# Patient Record
Sex: FEMALE | Race: WHITE | NOT HISPANIC OR LATINO | Employment: OTHER | URBAN - METROPOLITAN AREA
[De-identification: names, ages, dates, MRNs, and addresses within clinical notes are randomized per-mention and may not be internally consistent; named-entity substitution may affect disease eponyms.]

---

## 2023-09-27 ENCOUNTER — OFFICE VISIT (OUTPATIENT)
Dept: ORTHOPEDIC SURGERY | Facility: CLINIC | Age: 68
End: 2023-09-27
Payer: MEDICARE

## 2023-09-27 VITALS
SYSTOLIC BLOOD PRESSURE: 120 MMHG | BODY MASS INDEX: 21.66 KG/M2 | HEIGHT: 65 IN | DIASTOLIC BLOOD PRESSURE: 80 MMHG | WEIGHT: 130 LBS

## 2023-09-27 DIAGNOSIS — M17.0 PRIMARY OSTEOARTHRITIS OF BOTH KNEES: Primary | ICD-10-CM

## 2023-09-27 PROCEDURE — 1159F MED LIST DOCD IN RCRD: CPT | Performed by: ORTHOPAEDIC SURGERY

## 2023-09-27 PROCEDURE — 1160F RVW MEDS BY RX/DR IN RCRD: CPT | Performed by: ORTHOPAEDIC SURGERY

## 2023-09-27 PROCEDURE — 99204 OFFICE O/P NEW MOD 45 MIN: CPT | Performed by: ORTHOPAEDIC SURGERY

## 2023-09-27 RX ORDER — CETIRIZINE HYDROCHLORIDE 10 MG/1
10 TABLET ORAL DAILY
COMMUNITY

## 2023-09-27 RX ORDER — ARIPIPRAZOLE 5 MG/1
10 TABLET ORAL DAILY
COMMUNITY

## 2023-09-27 RX ORDER — PROPRANOLOL HYDROCHLORIDE 10 MG/1
10 TABLET ORAL
COMMUNITY

## 2023-09-27 RX ORDER — TRAZODONE HYDROCHLORIDE 50 MG/1
50 TABLET ORAL
COMMUNITY
Start: 2023-06-05 | End: 2024-06-04

## 2023-09-27 RX ORDER — MAGNESIUM 30 MG
30 TABLET ORAL DAILY
COMMUNITY

## 2023-09-27 RX ORDER — MULTIVITAMIN
1 CAPSULE ORAL DAILY
COMMUNITY

## 2023-09-27 RX ORDER — LAMOTRIGINE 100 MG/1
TABLET ORAL
COMMUNITY
Start: 2023-06-07

## 2023-09-27 RX ORDER — VIT C/B6/B5/MAGNESIUM/HERB 173 50-5-6-5MG
CAPSULE ORAL DAILY
COMMUNITY

## 2023-09-27 RX ORDER — ASPIRIN 81 MG/1
81 TABLET ORAL DAILY
COMMUNITY

## 2023-09-27 NOTE — PROGRESS NOTES
[facility] [place] [facility] Note    Subjective     Chief Complaint   Patient presents with    Left Knee - Pain    Right Knee - Pain        HPI    Edwina Garcia is a 68 y.o. female who presents with new problem of: bilateral knee pain.  Onset: atraumatic and gradual in nature. The issue has been ongoing for 1 year(s). Pain is a 5/10 on the pain scale. Pain is described as aching. Associated symptoms include pain and popping. The pain is worse with walking, climbing stairs, and sleeping; pain medication and/or NSAID improve the pain. Previous treatments have included: bracing and NSAIDS.  Left knee bothers her more than the right.  Most of the pain is on the anterior aspect of both knees.  No previous injections.  Intermittently uses ibuprofen with improvement.    I have reviewed the following portions of the patient's history and agree with: History of Present Illness and Review of Systems    There is no problem list on file for this patient.    History reviewed. No pertinent past medical history.   Past Surgical History:   Procedure Laterality Date    HYSTERECTOMY      TOE AMPUTATION Left     second toe    TONSILLECTOMY AND ADENOIDECTOMY        Family History   Problem Relation Age of Onset    Arthritis Maternal Grandfather      Social History     Socioeconomic History    Marital status:    Tobacco Use    Smoking status: Never    Smokeless tobacco: Never   Vaping Use    Vaping Use: Never used   Substance and Sexual Activity    Alcohol use: Yes     Alcohol/week: 1.0 - 2.0 standard drink     Types: 1 - 2 Glasses of wine per week    Drug use: Never    Sexual activity: Never      Current Outpatient Medications on File Prior to Visit   Medication Sig Dispense Refill    ARIPiprazole (ABILIFY) 5 MG tablet Take 2 tablets by mouth Daily.      aspirin 81 MG EC tablet Take 1 tablet by mouth Daily.      cetirizine (zyrTEC) 10 MG tablet Take 1 tablet by mouth Daily.      lamoTRIgine (LaMICtal) 100 MG tablet        "magnesium 30 MG tablet Take 1 tablet by mouth Daily.      Multiple Vitamin (multivitamin) capsule Take 1 capsule by mouth Daily.      propranolol (INDERAL) 10 MG tablet Take 1 tablet by mouth.      traZODone (DESYREL) 50 MG tablet Take 1 tablet by mouth.      Turmeric 500 MG capsule Take  by mouth Daily.       No current facility-administered medications on file prior to visit.      Allergies   Allergen Reactions    Amoxicillin GI Intolerance    Penicillin G Hives        Review of Systems   Constitutional: Negative.    HENT: Negative.     Eyes: Negative.    Respiratory: Negative.     Cardiovascular: Negative.    Gastrointestinal: Negative.    Endocrine: Negative.    Genitourinary: Negative.    Musculoskeletal:  Positive for arthralgias.   Skin: Negative.    Allergic/Immunologic: Negative.    Neurological: Negative.    Hematological: Negative.    Psychiatric/Behavioral: Negative.        Objective      Physical Exam  /80   Ht 164 cm (64.57\")   Wt 59 kg (130 lb)   BMI 21.92 kg/m²     Body mass index is 21.92 kg/m².    General:   Mental Status:  Alert   Appearance: Cooperative, in no acute distress   Build and Nutrition: Well-nourished well-developed female   Orientation: Alert and oriented to person, place and time   Posture: Normal   Gait: Nonantalgic/normal    Integument:   Right knee: no skin lesions, no rash, no ecchymosis   Left knee: no skin lesions, no rash, no ecchymosis    Neurologic:   Sensation:    Right foot: intact to light touch on the dorsal and plantar aspect    Left foot: intact to light touch on the dorsal and plantar aspect   Motor:  Right lower extremity: 5/5 quadriceps, hamstrings, ankle dorsiflexors, and ankle plantar flexors  Left lower extremity: 5/5 quadriceps, hamstrings, ankle dorsiflexors, and ankle plantar flexors  Vascular:   Right lower extremity: 2+ dorsalis pedis pulse, prompt capillary refill   Left lower extremity: 2+ dorsalis pedis pulse, prompt capillary refill    Lower " Extremities:   Right Knee:    Tenderness:  None    Effusion:  None    Swelling:  None    Crepitus:  Positive    Atrophy:  None    Range of motion:  Extension: 0°       Flexion: 130°  Instability:  No varus laxity, no valgus laxity, negative anterior drawer  Deformities:  None   Left Knee:    Tenderness:  None    Effusion:  None    Swelling:  None    Crepitus: Positive    Atrophy:  None    Range of motion:  Extension: 0°       Flexion: 130°  Instability:  No varus laxity, no valgus laxity, negative anterior drawer  Deformities:  None      Imaging/Studies      Imaging Results (Last 24 Hours)       Procedure Component Value Units Date/Time    XR Knee 4+ View Bilateral [[id number]] Resulted: 09/27/23 1129     Updated: 09/27/23 1132    Narrative:      Right Knee Radiographs  Indication: right knee pain  Views: Standing AP's and skiers of both knees, with lateral and sunrise   views of the right knee    Comparison: no prior studies available    Findings:    Medial joint space narrowing, tricompartmental osteophytes, patellofemoral   degeneration, no acute bony abnormalities.  No unusual bony features.    Left Knee Radiographs  Indication: left knee pain  Views: Standing AP's and skiers of both knees, with lateral and sunrise   views of the left knee    Comparison: no prior studies available    Findings:    Medial joint space narrowing, tricompartmental osteophytes, patellofemoral   degeneration bone-on-bone contact lateral facet, no acute bony   abnormalities.  Calcification in the intramedullary canal region in the   distal one third of the femur, with no cortical involvement, well-defined,   likely consistent with either bone infarct or calcified enchondroma.    Benign appearing.              Assessment and Plan     Diagnoses and all orders for this visit:    1. Primary osteoarthritis of both knees (Primary)  -     XR Knee 4+ View Bilateral        1. Primary osteoarthritis of both knees        I reviewed my findings with  the patient.  We discussed options for her knee arthritis, and at this point she prefers continuing with conservative treatment with intermittent use of ibuprofen as needed.  We discussed intra-articular injections today, but she is not keen on that idea.  I did recommend follow-up radiographs in 3 months on her left knee in particular, for follow-up of the benign-appearing bone lesion.  If there is any question going forward, we may consider further imaging with MRI.  No pain in the distal femur clinically.    Return in about 3 months (around 12/27/2023) for Recheck with X-Rays on the left knee.      Obie Darling MD  09/27/23  11:47 EDT

## 2024-01-10 ENCOUNTER — OFFICE VISIT (OUTPATIENT)
Dept: ORTHOPEDIC SURGERY | Facility: CLINIC | Age: 69
End: 2024-01-10
Payer: MEDICARE

## 2024-01-10 VITALS — BODY MASS INDEX: 21.96 KG/M2 | WEIGHT: 130.2 LBS | DIASTOLIC BLOOD PRESSURE: 74 MMHG | SYSTOLIC BLOOD PRESSURE: 136 MMHG

## 2024-01-10 DIAGNOSIS — M17.0 PRIMARY OSTEOARTHRITIS OF BOTH KNEES: Primary | ICD-10-CM

## 2024-01-10 PROCEDURE — 1159F MED LIST DOCD IN RCRD: CPT | Performed by: ORTHOPAEDIC SURGERY

## 2024-01-10 PROCEDURE — 1160F RVW MEDS BY RX/DR IN RCRD: CPT | Performed by: ORTHOPAEDIC SURGERY

## 2024-01-10 PROCEDURE — 99212 OFFICE O/P EST SF 10 MIN: CPT | Performed by: ORTHOPAEDIC SURGERY

## 2024-01-10 RX ORDER — ATOMOXETINE 40 MG/1
1 CAPSULE ORAL DAILY
COMMUNITY
Start: 2024-01-04

## 2024-01-10 NOTE — PROGRESS NOTES
[facility] [place] [facility] Note    Subjective     Chief Complaint   Patient presents with    Follow-up     3.5 month follow-up: Primary osteoarthritis of both knees        HPI    It has been 3.5  month(s) since Ms. Garcia's last visit. She returns to clinic today for follow-up of bilateral knee arthritis. The issue has been ongoing for 1 year(s). She rates her pain a 6/10 on the pain scale. Previous/current treatments: NSAIDS and physical therapy. Current symptoms: same as prior visit. The pain is worse with sleeping; pain medication and/or NSAID improve the pain. Overall, she is doing better.  Symptoms still controlled with ibuprofen intermittently.  No new complaints today.  No thigh pain.  Here today to have comparative x-rays obtained of the left knee.    I have reviewed the following portions of the patient's history and agree with: History of Present Illness and Review of Systems    There is no problem list on file for this patient.    Past Medical History:   Diagnosis Date    Hip arthrosis     Knee swelling     Scoliosis     Controlled-no issues currently      Past Surgical History:   Procedure Laterality Date    FOOT SURGERY  Bilateral bunionectomies    HYSTERECTOMY      TOE AMPUTATION Left     second toe    TONSILLECTOMY AND ADENOIDECTOMY        Family History   Problem Relation Age of Onset    Arthritis Maternal Grandfather     Osteoporosis Mother     Diabetes Father      Social History     Socioeconomic History    Marital status:    Tobacco Use    Smoking status: Former     Packs/day: 2.00     Years: 10.00     Additional pack years: 0.00     Total pack years: 20.00     Types: Cigarettes     Start date: 3/1/1978     Quit date: 1986     Years since quittin.5     Passive exposure: Past    Smokeless tobacco: Never   Vaping Use    Vaping Use: Never used   Substance and Sexual Activity    Alcohol use: Yes     Alcohol/week: 1.0 - 2.0 standard drink of alcohol     Types: 1 - 2 Glasses  of wine per week     Comment: wine spritzers not straight    Drug use: Never    Sexual activity: Not Currently     Partners: Male     Birth control/protection: Post-menopausal, Bilateral salpingectomy , Hysterectomy      Current Outpatient Medications on File Prior to Visit   Medication Sig Dispense Refill    ARIPiprazole (ABILIFY) 5 MG tablet Take 2 tablets by mouth Daily.      aspirin 81 MG EC tablet Take 1 tablet by mouth Daily.      atomoxetine (STRATTERA) 40 MG capsule Take 1 capsule by mouth Daily.      cetirizine (zyrTEC) 10 MG tablet Take 1 tablet by mouth Daily.      lamoTRIgine (LaMICtal) 100 MG tablet       magnesium 30 MG tablet Take 1 tablet by mouth Daily.      Multiple Vitamin (multivitamin) capsule Take 1 capsule by mouth Daily.      propranolol (INDERAL) 10 MG tablet Take 1 tablet by mouth.      traZODone (DESYREL) 50 MG tablet Take 1 tablet by mouth.      Turmeric 500 MG capsule Take  by mouth Daily.       No current facility-administered medications on file prior to visit.      Allergies   Allergen Reactions    Amoxicillin GI Intolerance    Penicillin G Hives        Review of Systems   Constitutional:  Negative for activity change, appetite change, chills, diaphoresis, fatigue, fever and unexpected weight change.   HENT:  Negative for congestion, dental problem, drooling, ear discharge, ear pain, facial swelling, hearing loss, mouth sores, nosebleeds, postnasal drip, rhinorrhea, sinus pressure, sneezing, sore throat, tinnitus, trouble swallowing and voice change.    Eyes:  Negative for photophobia, pain, discharge, redness, itching and visual disturbance.   Respiratory:  Negative for apnea, cough, choking, chest tightness, shortness of breath, wheezing and stridor.    Cardiovascular:  Negative for chest pain, palpitations and leg swelling.   Gastrointestinal:  Negative for abdominal distention, abdominal pain, anal bleeding, blood in stool, constipation, diarrhea, nausea, rectal pain and vomiting.    Endocrine: Negative for cold intolerance, heat intolerance, polydipsia, polyphagia and polyuria.   Genitourinary:  Negative for decreased urine volume, difficulty urinating, dysuria, enuresis, flank pain, frequency, genital sores, hematuria and urgency.   Musculoskeletal:  Positive for arthralgias. Negative for back pain, gait problem, joint swelling, myalgias, neck pain and neck stiffness.   Skin:  Negative for color change, pallor, rash and wound.   Allergic/Immunologic: Negative for environmental allergies, food allergies and immunocompromised state.   Neurological:  Negative for dizziness, tremors, seizures, syncope, facial asymmetry, speech difficulty, weakness, light-headedness, numbness and headaches.   Hematological:  Negative for adenopathy. Does not bruise/bleed easily.   Psychiatric/Behavioral:  Negative for agitation, behavioral problems, confusion, decreased concentration, dysphoric mood, hallucinations, self-injury, sleep disturbance and suicidal ideas. The patient is not nervous/anxious and is not hyperactive.         Objective      Physical Exam  /74   Wt 59.1 kg (130 lb 3.2 oz)   BMI 21.96 kg/m²     Body mass index is 21.96 kg/m².    General:   Mental Status:  Alert   Appearance: Cooperative, in no acute distress   Build and Nutrition: Well-nourished well-developed female   Orientation: Alert and oriented to person, place and time   Posture: Normal   Gait: Nonantalgic/normal    Integument:              Right knee: no skin lesions, no rash, no ecchymosis              Left knee: no skin lesions, no rash, no ecchymosis     Lower Extremities:              Right Knee:                          Tenderness:    None                          Effusion:          None                          Swelling:          None                          Crepitus:          Positive                          Atrophy:           None                          Range of motion:        Extension:       0°                                                               Flexion:           130°  Instability:        No varus laxity, no valgus laxity, negative anterior drawer  Deformities:     None              Left Knee:                          Tenderness:    None                          Effusion:          None                          Swelling:          None                          Crepitus:          Positive                          Atrophy:           None                          Range of motion:        Extension:       0°                                                              Flexion:           130°  Instability:        No varus laxity, no valgus laxity, negative anterior drawer  Deformities:     None    Imaging/Studies  Imaging Results (Last 24 Hours)       Procedure Component Value Units Date/Time    XR Knee 4+ View Left [[id number]] Resulted: 01/10/24 1114     Updated: 01/10/24 1116    Narrative:      Left Knee Radiographs  Indication: left knee pain  Views: Standing AP's and skiers of both knees, with lateral and sunrise   views of the left knee    Comparison: 9/27/2023    Findings:   Stable calcification left distal femur, with no change in arthritic   findings.  Calcification does appear to be benign in appearance, with no   cortical disruption and no enlargement compared to previous imaging.                Assessment and Plan     Diagnoses and all orders for this visit:    1. Primary osteoarthritis of both knees (Primary)  -     Cancel: XR Knee 4+ View Left  -     XR Knee 4+ View Left        1. Primary osteoarthritis of both knees        I reviewed my findings with the patient.  Calcification in the left distal femur stable, and benign appearing.  I will see her back in a year with a new x-ray, but I will be happy to see her back sooner for any problems.    Return in about 1 year (around 1/10/2025) for Recheck with X-Rays.      Obie Darling MD  01/10/24  11:20 EST

## 2025-01-13 ENCOUNTER — OFFICE VISIT (OUTPATIENT)
Dept: ORTHOPEDIC SURGERY | Facility: CLINIC | Age: 70
End: 2025-01-13
Payer: MEDICARE

## 2025-01-13 VITALS
BODY MASS INDEX: 23.36 KG/M2 | DIASTOLIC BLOOD PRESSURE: 70 MMHG | SYSTOLIC BLOOD PRESSURE: 126 MMHG | WEIGHT: 140.2 LBS | HEIGHT: 65 IN

## 2025-01-13 DIAGNOSIS — M17.0 PRIMARY OSTEOARTHRITIS OF BOTH KNEES: Primary | ICD-10-CM

## 2025-01-13 PROCEDURE — 1159F MED LIST DOCD IN RCRD: CPT | Performed by: ORTHOPAEDIC SURGERY

## 2025-01-13 PROCEDURE — 1160F RVW MEDS BY RX/DR IN RCRD: CPT | Performed by: ORTHOPAEDIC SURGERY

## 2025-01-13 PROCEDURE — 99213 OFFICE O/P EST LOW 20 MIN: CPT | Performed by: ORTHOPAEDIC SURGERY

## 2025-01-13 RX ORDER — CHOLECALCIFEROL (VITAMIN D3) 25 MCG
5000 TABLET ORAL DAILY
COMMUNITY

## 2025-01-13 NOTE — PROGRESS NOTES
[facility] [place] [facility] Note    Subjective     Chief Complaint   Patient presents with    Follow-up     1 year f/u --  Primary osteoarthritis of both knees        HPI    It has been 1  year(s) since Ms. Garcia's last visit. She returns to clinic today for follow-up of bilateral knee arthritis. The issue has been ongoing for 2 year(s). She rates her pain a 5/10 on the pain scale. Previous/current treatments: bracing and NSAIDS. Current symptoms: pain. The pain is worse with walking, sitting, climbing stairs, and sleeping; resting, ice, and pain medication and/or NSAID improve the pain. Overall, she is doing the same.  Here today for routine follow-up.  Specifically we were getting repeat x-rays of her left distal femur to compare the calcification in the distal femur.  Her knee arthritis symptoms are currently controlled with over-the-counter ibuprofen.      I have reviewed the following portions of the patient's history and agree with: History of Present Illness and Review of Systems    There is no problem list on file for this patient.    Past Medical History:   Diagnosis Date    Hip arthrosis     Knee swelling     Scoliosis     Controlled-no issues currently      Past Surgical History:   Procedure Laterality Date    FOOT SURGERY  Bilateral bunionectomies    HYSTERECTOMY      TOE AMPUTATION Left     second toe    TONSILLECTOMY AND ADENOIDECTOMY        Family History   Problem Relation Age of Onset    Arthritis Maternal Grandfather     Osteoporosis Mother     Diabetes Father      Social History     Socioeconomic History    Marital status:    Tobacco Use    Smoking status: Former     Current packs/day: 0.00     Average packs/day: 2.0 packs/day for 10.0 years (20.1 ttl pk-yrs)     Types: Cigarettes     Start date: 3/1/1978     Quit date: 1986     Years since quittin.5     Passive exposure: Past    Smokeless tobacco: Never   Vaping Use    Vaping status: Never Used   Substance and Sexual  Activity    Alcohol use: Yes     Alcohol/week: 1.0 - 2.0 standard drink of alcohol     Types: 1 - 2 Glasses of wine per week     Comment: wine spritzers not straight    Drug use: Never    Sexual activity: Not Currently     Partners: Male     Birth control/protection: Post-menopausal, Bilateral salpingectomy , Hysterectomy      Current Outpatient Medications on File Prior to Visit   Medication Sig Dispense Refill    ARIPiprazole (ABILIFY) 5 MG tablet Take 2 tablets by mouth Daily.      aspirin 81 MG EC tablet Take 1 tablet by mouth Daily.      atomoxetine (STRATTERA) 40 MG capsule Take 1 capsule by mouth Daily.      cetirizine (zyrTEC) 10 MG tablet Take 1 tablet by mouth Daily.      Cholecalciferol 25 MCG (1000 UT) tablet Take 5 tablets by mouth Daily.      lamoTRIgine (LaMICtal) 100 MG tablet       magnesium 30 MG tablet Take 1 tablet by mouth Daily.      Multiple Vitamin (multivitamin) capsule Take 1 capsule by mouth Daily.      propranolol (INDERAL) 10 MG tablet Take 1 tablet by mouth.      Turmeric 500 MG capsule Take  by mouth Daily.      traZODone (DESYREL) 50 MG tablet Take 1 tablet by mouth.       No current facility-administered medications on file prior to visit.      Allergies   Allergen Reactions    Amoxicillin GI Intolerance    Penicillin G Hives        Review of Systems   Constitutional:  Negative for activity change, appetite change, chills, diaphoresis, fatigue, fever and unexpected weight change.   HENT:  Negative for congestion, dental problem, drooling, ear discharge, ear pain, facial swelling, hearing loss, mouth sores, nosebleeds, postnasal drip, rhinorrhea, sinus pressure, sneezing, sore throat, tinnitus, trouble swallowing and voice change.    Eyes:  Negative for photophobia, pain, discharge, redness, itching and visual disturbance.   Respiratory:  Negative for apnea, cough, choking, chest tightness, shortness of breath, wheezing and stridor.    Cardiovascular:  Negative for chest pain,  "palpitations and leg swelling.   Gastrointestinal:  Negative for abdominal distention, abdominal pain, anal bleeding, blood in stool, constipation, diarrhea, nausea, rectal pain and vomiting.   Endocrine: Negative for cold intolerance, heat intolerance, polydipsia, polyphagia and polyuria.   Genitourinary:  Negative for decreased urine volume, difficulty urinating, dysuria, enuresis, flank pain, frequency, genital sores, hematuria and urgency.   Musculoskeletal:  Positive for arthralgias. Negative for back pain, gait problem, joint swelling, myalgias, neck pain and neck stiffness.   Skin:  Negative for color change, pallor, rash and wound.   Allergic/Immunologic: Negative for environmental allergies, food allergies and immunocompromised state.   Neurological:  Negative for dizziness, tremors, seizures, syncope, facial asymmetry, speech difficulty, weakness, light-headedness, numbness and headaches.   Hematological:  Negative for adenopathy. Does not bruise/bleed easily.   Psychiatric/Behavioral:  Negative for agitation, behavioral problems, confusion, decreased concentration, dysphoric mood, hallucinations, self-injury, sleep disturbance and suicidal ideas. The patient is not nervous/anxious and is not hyperactive.         Objective      Physical Exam  /70   Ht 164 cm (64.57\")   Wt 63.6 kg (140 lb 3.2 oz)   BMI 23.64 kg/m²     Body mass index is 23.64 kg/m².  BMI is within normal parameters. No other follow-up for BMI required.      General:   Mental Status:  Alert   Appearance: Cooperative, in no acute distress   Build and Nutrition: Well-nourished well-developed female   Orientation: Alert and oriented to person, place and time   Posture: Normal   Gait: Nonantalgic/normal    Integument:              Right knee: no skin lesions, no rash, no ecchymosis              Left knee: no skin lesions, no rash, no ecchymosis     Lower Extremities:              Right Knee:                          Tenderness:    " Medial/lateral joint line tenderness                          Effusion:          None                          Swelling:          None                          Crepitus:          Positive                          Atrophy:           None                          Range of motion:        Extension:       0°                                                              Flexion:           130°  Instability:        No varus laxity, no valgus laxity, negative anterior drawer  Deformities:     None              Left Knee:                          Tenderness:    Medial/lateral joint line tenderness                          Effusion:          None                          Swelling:          None                          Crepitus:          Positive                          Atrophy:           None                          Range of motion:        Extension:       0°                                                              Flexion:           130°  Instability:        No varus laxity, no valgus laxity, negative anterior drawer  Deformities:     None    Imaging/Studies  Imaging Results (Last 24 Hours)       Procedure Component Value Units Date/Time    XR Knee 4+ View Bilateral [[id number]] Resulted: 01/13/25 0951     Updated: 01/13/25 0951    Narrative:      Right Knee Radiographs  Indication: right knee pain  Views: Standing AP's and skiers of both knees, with lateral and sunrise   views of the right knee    Comparison: 9/27/2023    Findings:    Arthritic changes, with medial joint space narrowing and patellofemoral   arthritis, with no unusual bony features.  No significant change compared   to the previous imaging.    Left Knee Radiographs  Indication: left knee pain  Views: Standing AP's and skiers of both knees, with lateral and sunrise   views of the left knee    Comparison: 9/27/2023 and 1/10/2024    Findings:   Stable intramedullary calcification left distal femur, bone infarct versus   enchondroma, with no unusual  features no cortical disruption.  Arthritic   changes stable, with medial joint space narrowing and advanced   patellofemoral arthritis.                Assessment and Plan     Diagnoses and all orders for this visit:    1. Primary osteoarthritis of both knees (Primary)  -     XR Knee 4+ View Bilateral        1. Primary osteoarthritis of both knees          I reviewed my findings with the patient.  Symptoms are currently controlled with anti-inflammatories (ibuprofen) and I will see her back if she has any worsening or problems in the future.  Of note, the calcification in the left distal femur is stable and benign appearing.    Return if symptoms worsen or fail to improve.      Obie Darling MD  01/13/25  10:06 EST    Dictated Utilizing Dragon Dictation

## 2025-08-27 ENCOUNTER — OFFICE VISIT (OUTPATIENT)
Dept: ORTHOPEDIC SURGERY | Facility: CLINIC | Age: 70
End: 2025-08-27
Payer: MEDICARE

## 2025-08-27 VITALS — WEIGHT: 135 LBS | BODY MASS INDEX: 22.49 KG/M2 | HEIGHT: 65 IN

## 2025-08-27 DIAGNOSIS — M79.672 LEFT FOOT PAIN: Primary | ICD-10-CM

## 2025-08-27 DIAGNOSIS — I87.8 VENOUS STASIS: ICD-10-CM

## 2025-08-27 PROCEDURE — 1160F RVW MEDS BY RX/DR IN RCRD: CPT | Performed by: ORTHOPAEDIC SURGERY

## 2025-08-27 PROCEDURE — 99214 OFFICE O/P EST MOD 30 MIN: CPT | Performed by: ORTHOPAEDIC SURGERY

## 2025-08-27 PROCEDURE — 1159F MED LIST DOCD IN RCRD: CPT | Performed by: ORTHOPAEDIC SURGERY

## 2025-08-27 RX ORDER — ARIPIPRAZOLE 10 MG/1
10 TABLET ORAL DAILY
COMMUNITY
Start: 2025-06-07

## 2025-08-27 RX ORDER — LANOLIN ALCOHOL/MO/W.PET/CERES
1000 CREAM (GRAM) TOPICAL DAILY
COMMUNITY

## 2025-08-27 RX ORDER — LAMOTRIGINE 150 MG/1
150 TABLET ORAL EVERY MORNING
COMMUNITY
Start: 2025-07-23